# Patient Record
Sex: MALE | Employment: OTHER | URBAN - METROPOLITAN AREA
[De-identification: names, ages, dates, MRNs, and addresses within clinical notes are randomized per-mention and may not be internally consistent; named-entity substitution may affect disease eponyms.]

---

## 2023-03-22 ENCOUNTER — TRANSCRIBE ORDERS (OUTPATIENT)
Dept: CARDIOLOGY | Facility: CLINIC | Age: 73
End: 2023-03-22

## 2023-03-22 DIAGNOSIS — J40 BRONCHITIS: Primary | ICD-10-CM

## 2023-05-05 RX ORDER — ALBUTEROL SULFATE 0.83 MG/ML
2.5 SOLUTION RESPIRATORY (INHALATION) ONCE
Status: COMPLETED | OUTPATIENT
Start: 2023-05-05 | End: 2023-05-08

## 2023-05-08 ENCOUNTER — HOSPITAL ENCOUNTER (OUTPATIENT)
Dept: RESPIRATORY THERAPY | Facility: CLINIC | Age: 73
Discharge: HOME | End: 2023-05-08
Attending: PHYSICAL MEDICINE & REHABILITATION | Admitting: PHYSICAL MEDICINE & REHABILITATION
Payer: OTHER GOVERNMENT

## 2023-05-08 DIAGNOSIS — J40 BRONCHITIS: ICD-10-CM

## 2023-05-08 LAB
EXPTIME-PRE: 6.2 SEC
FEF2575-%PRED-POST: 169 %
FEF2575-%PRED-PRE: 149 %
FEF2575-POST: 3.72 L/SEC
FEF2575-PRE: 3.28 L/SEC
FEF2575-PRED: 2.19 L/SEC
FEFMAX-%PRED-PRE: 103 %
FEFMAX-PRE: 8.31 L/SEC
FEFMAX-PRED: 8.05 L/SEC
FEV1-%PRED-PRE: 122 %
FEV1-PRE: 3.54 L
FEV1FEV6-PRE: 80 %
FEV1FEV6-PRED: 77 %
FEV1FVC-PRE: 80 %
FEV1FVC-PRED: 76 %
FIFMAX-PRE: 4.47 L/SEC
FVC-%PRED-PRE: 115 %
FVC-PRE: 4.41 L
FVC-PRED: 3.81 L

## 2023-05-08 PROCEDURE — 94060 EVALUATION OF WHEEZING: CPT

## 2023-05-08 PROCEDURE — 999N000157 HC STATISTIC RCP TIME EA 10 MIN

## 2023-05-08 PROCEDURE — 250N000009 HC RX 250

## 2023-05-08 RX ADMIN — ALBUTEROL SULFATE 2.5 MG: 2.5 SOLUTION RESPIRATORY (INHALATION) at 12:32

## 2023-05-08 NOTE — PROGRESS NOTES
RESPIRATORY CARE NOTE    Complete Pulmonary Function Test completed by patient.  Good patient effort and cooperation. Albuterol 2.5 mg neb given for bronchodilation.  The results of this test do not meet the ATS standards for acceptability and repeatability. Pt unable to perform the FVC correctly and without errors. Many of the patients efforts were not recorded due to effort.  PT returned to baseline and left in no distress.    Mabel Melara, RT  5/8/2023